# Patient Record
Sex: MALE
[De-identification: names, ages, dates, MRNs, and addresses within clinical notes are randomized per-mention and may not be internally consistent; named-entity substitution may affect disease eponyms.]

---

## 2020-10-15 PROBLEM — Z00.00 ENCOUNTER FOR PREVENTIVE HEALTH EXAMINATION: Status: ACTIVE | Noted: 2020-10-15

## 2020-10-16 ENCOUNTER — APPOINTMENT (OUTPATIENT)
Dept: FAMILY MEDICINE | Facility: CLINIC | Age: 70
End: 2020-10-16
Payer: MEDICARE

## 2020-10-16 VITALS
SYSTOLIC BLOOD PRESSURE: 136 MMHG | TEMPERATURE: 98 F | HEIGHT: 75 IN | BODY MASS INDEX: 23.5 KG/M2 | DIASTOLIC BLOOD PRESSURE: 71 MMHG | RESPIRATION RATE: 18 BRPM | OXYGEN SATURATION: 99 % | WEIGHT: 189 LBS | HEART RATE: 62 BPM

## 2020-10-16 DIAGNOSIS — Z83.3 FAMILY HISTORY OF DIABETES MELLITUS: ICD-10-CM

## 2020-10-16 DIAGNOSIS — Z20.828 CONTACT WITH AND (SUSPECTED) EXPOSURE TO OTHER VIRAL COMMUNICABLE DISEASES: ICD-10-CM
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
## 2020-10-16 DIAGNOSIS — Z84.1 FAMILY HISTORY OF DISORDERS OF KIDNEY AND URETER: ICD-10-CM

## 2020-10-16 DIAGNOSIS — Z78.9 OTHER SPECIFIED HEALTH STATUS: ICD-10-CM

## 2020-10-16 DIAGNOSIS — G25.0 ESSENTIAL TREMOR: ICD-10-CM

## 2020-10-16 DIAGNOSIS — Z80.0 FAMILY HISTORY OF MALIGNANT NEOPLASM OF DIGESTIVE ORGANS: ICD-10-CM

## 2020-10-16 DIAGNOSIS — I63.9 CEREBRAL INFARCTION, UNSPECIFIED: ICD-10-CM

## 2020-10-16 DIAGNOSIS — E78.5 HYPERLIPIDEMIA, UNSPECIFIED: ICD-10-CM

## 2020-10-16 DIAGNOSIS — N40.0 BENIGN PROSTATIC HYPERPLASIA WITHOUT LOWER URINARY TRACT SYMPMS: ICD-10-CM

## 2020-10-16 DIAGNOSIS — M51.26 OTHER INTERVERTEBRAL DISC DISPLACEMENT, LUMBAR REGION: ICD-10-CM

## 2020-10-16 DIAGNOSIS — Z23 ENCOUNTER FOR IMMUNIZATION: ICD-10-CM

## 2020-10-16 DIAGNOSIS — Z76.89 PERSONS ENCOUNTERING HEALTH SERVICES IN OTHER SPECIFIED CIRCUMSTANCES: ICD-10-CM

## 2020-10-16 DIAGNOSIS — Z80.42 FAMILY HISTORY OF MALIGNANT NEOPLASM OF PROSTATE: ICD-10-CM

## 2020-10-16 DIAGNOSIS — Z80.6 FAMILY HISTORY OF LEUKEMIA: ICD-10-CM

## 2020-10-16 DIAGNOSIS — Z80.9 FAMILY HISTORY OF MALIGNANT NEOPLASM, UNSPECIFIED: ICD-10-CM

## 2020-10-16 DIAGNOSIS — Z86.19 PERSONAL HISTORY OF OTHER INFECTIOUS AND PARASITIC DISEASES: ICD-10-CM

## 2020-10-16 DIAGNOSIS — M50.90 CERVICAL DISC DISORDER, UNSPECIFIED, UNSPECIFIED CERVICAL REGION: ICD-10-CM

## 2020-10-16 PROCEDURE — 99204 OFFICE O/P NEW MOD 45 MIN: CPT | Mod: CS,25

## 2020-10-16 PROCEDURE — G0444 DEPRESSION SCREEN ANNUAL: CPT | Mod: 59

## 2020-10-16 RX ORDER — FINASTERIDE 5 MG/1
5 TABLET, FILM COATED ORAL DAILY
Qty: 90 | Refills: 0 | Status: ACTIVE | COMMUNITY
Start: 2020-10-16

## 2020-10-16 RX ORDER — ATORVASTATIN CALCIUM 80 MG/1
TABLET, FILM COATED ORAL
Refills: 0 | Status: DISCONTINUED | COMMUNITY
End: 2020-10-16

## 2020-10-16 RX ORDER — ATORVASTATIN CALCIUM 40 MG/1
40 TABLET, FILM COATED ORAL
Qty: 1 | Refills: 3 | Status: ACTIVE | COMMUNITY
Start: 2020-10-16

## 2020-10-16 RX ORDER — CLOPIDOGREL BISULFATE 75 MG/1
75 TABLET, FILM COATED ORAL DAILY
Qty: 30 | Refills: 0 | Status: ACTIVE | COMMUNITY
Start: 2020-10-16

## 2020-10-16 NOTE — ASSESSMENT
[FreeTextEntry1] : Establish care \par -Will do BW\par \par BPH\par -continue Flomax and Finasteride\par \par Ocular Stroke\par -follows Neuropthamology once a year\par -takes Plavix daily \par \par HLD\par -continue cholesterol medication\par -low fat diet\par -Lipid panel follow up \par -Life style modification discussed\par \par Tick bite\par -check for tick panel  \par \par Lumbar and cervical disc herniation\par -stable \par \par Essential Tremors\par -seeing Neurology possibly benign \par -advised medication and he didn’t take as it makes him drowsy \par \par \par Received Flu vaccine last month at [facility] \par \par \par \par

## 2020-10-16 NOTE — HISTORY OF PRESENT ILLNESS
[FreeTextEntry8] : ROBERTO BELLE 69 year yrs old  male presents office comes to Establish care .\par Wanted to switch doctors Dr Cerna  seeing for about 10 years  -[place] -[place] to drive for him .Was seeing Dr Canas . \par Was seeing Neuropthamology -Ocular stroke 2016 around -Lost peripheral vision ,took ASA started getting eye sight back and pixilating . \par US of carotid was done negative .\par Has a Neuropthamology Dr Ford -Vision testing and went to the ER . Had loop Monitor for 3 years was negative . \par Planning to see cardiology soon . \par Has high cholesterol on Lipitor and takes Plavix for Ocular stroke  .\par Takes Flomax and Finasteride for the BPH . 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  \par Denies any other complaints. No Fever, Chills, Nausea, Vomiting, Diarrhea, Headache, Chest Pain, Shortness of breath or Abdominal pain.\par

## 2020-10-16 NOTE — PHYSICAL EXAM
[No Acute Distress] : no acute distress [Well Nourished] : well nourished [Well Developed] : well developed [Normal Sclera/Conjunctiva] : normal sclera/conjunctiva [Well-Appearing] : well-appearing [PERRL] : pupils equal round and reactive to light [EOMI] : extraocular movements intact [Normal Oropharynx] : the oropharynx was normal [No JVD] : no jugular venous distention [No Lymphadenopathy] : no lymphadenopathy [Supple] : supple [Thyroid Normal, No Nodules] : the thyroid was normal and there were no nodules present [No Respiratory Distress] : no respiratory distress  [No Accessory Muscle Use] : no accessory muscle use [Clear to Auscultation] : lungs were clear to auscultation bilaterally [Normal Rate] : normal rate  [Regular Rhythm] : with a regular rhythm [Normal S1, S2] : normal S1 and S2 [No Murmur] : no murmur heard [No Abdominal Bruit] : a ~M bruit was not heard ~T in the abdomen [No Carotid Bruits] : no carotid bruits [No Varicosities] : no varicosities [Pedal Pulses Present] : the pedal pulses are present [No Edema] : there was no peripheral edema [No Palpable Aorta] : no palpable aorta [No Extremity Clubbing/Cyanosis] : no extremity clubbing/cyanosis [Soft] : abdomen soft [Non Tender] : non-tender [Non-distended] : non-distended [No Masses] : no abdominal mass palpated [Normal Bowel Sounds] : normal bowel sounds [No HSM] : no HSM [Normal Anterior Cervical Nodes] : no anterior cervical lymphadenopathy [Normal Posterior Cervical Nodes] : no posterior cervical lymphadenopathy [No CVA Tenderness] : no CVA  tenderness [No Joint Swelling] : no joint swelling [No Spinal Tenderness] : no spinal tenderness [Grossly Normal Strength/Tone] : grossly normal strength/tone [No Rash] : no rash [No Focal Deficits] : no focal deficits [Normal Gait] : normal gait [Normal Affect] : the affect was normal [Normal Insight/Judgement] : insight and judgment were intact [de-identified] : tremors in hands  [de-identified] : Left ear wax

## 2021-01-26 ENCOUNTER — RX RENEWAL (OUTPATIENT)
Age: 71
End: 2021-01-26

## 2021-01-26 RX ORDER — TAMSULOSIN HYDROCHLORIDE 0.4 MG/1
0.4 CAPSULE ORAL
Qty: 90 | Refills: 0 | Status: ACTIVE | COMMUNITY
Start: 2020-10-16 | End: 1900-01-01

## 2022-07-13 ENCOUNTER — NON-APPOINTMENT (OUTPATIENT)
Age: 72
End: 2022-07-13

## 2023-10-06 ENCOUNTER — OFFICE (OUTPATIENT)
Dept: URBAN - METROPOLITAN AREA CLINIC 115 | Facility: CLINIC | Age: 73
Setting detail: OPHTHALMOLOGY
End: 2023-10-06
Payer: MEDICARE

## 2023-10-06 DIAGNOSIS — I63.50: ICD-10-CM

## 2023-10-06 DIAGNOSIS — H53.433: ICD-10-CM

## 2023-10-06 DIAGNOSIS — H25.13: ICD-10-CM

## 2023-10-06 DIAGNOSIS — H35.033: ICD-10-CM

## 2023-10-06 PROCEDURE — 92014 COMPRE OPH EXAM EST PT 1/>: CPT | Performed by: OPHTHALMOLOGY

## 2023-10-06 PROCEDURE — 92083 EXTENDED VISUAL FIELD XM: CPT | Performed by: OPHTHALMOLOGY

## 2023-10-06 PROCEDURE — 92250 FUNDUS PHOTOGRAPHY W/I&R: CPT | Performed by: OPHTHALMOLOGY

## 2023-10-06 ASSESSMENT — KERATOMETRY
OS_K1POWER_DIOPTERS: 45.25
OD_K1POWER_DIOPTERS: 45.25
OD_K2POWER_DIOPTERS: 46.25
OS_AXISANGLE_DEGREES: 176
METHOD_AUTO_MANUAL: AUTO
OS_K2POWER_DIOPTERS: 46.25
OD_AXISANGLE_DEGREES: 006

## 2023-10-06 ASSESSMENT — CONFRONTATIONAL VISUAL FIELD TEST (CVF)
OD_FINDINGS: FULL
OS_FINDINGS: FULL

## 2023-10-06 ASSESSMENT — TONOMETRY
OD_IOP_MMHG: 16
OS_IOP_MMHG: 18

## 2023-10-06 ASSESSMENT — VISUAL ACUITY
OS_BCVA: 20/30-2
OD_BCVA: 20/20-1

## 2024-02-02 ASSESSMENT — KERATOMETRY
OS_K1POWER_DIOPTERS: 45.25
OS_K2POWER_DIOPTERS: 46.25
METHOD_AUTO_MANUAL: AUTO
OS_AXISANGLE_DEGREES: 176
OD_AXISANGLE_DEGREES: 006
OD_K1POWER_DIOPTERS: 45.25
OD_K2POWER_DIOPTERS: 46.25

## 2024-02-02 ASSESSMENT — REFRACTION_MANIFEST
OD_AXIS: 080
OS_VA1: 20/25+2
OS_CYLINDER: -2.75
OS_ADD: +2.50
OD_ADD: +2.50
OD_VA2: 20/25(J1)
OD_SPHERE: +3.00
OS_VA2: 20/25(J1)
OD_VA1: 20/25-2
OU_VA: 20/20
OS_AXIS: 090
OD_CYLINDER: -1.50
OS_SPHERE: +3.50

## 2024-02-02 ASSESSMENT — REFRACTION_CURRENTRX
OS_OVR_VA: 20/
OS_SPHERE: +3.50
OD_AXIS: 081
OD_CYLINDER: -1.50
OD_SPHERE: +3.00
OS_CYLINDER: -2.75
OS_ADD: +2.50
OD_ADD: +2.50
OS_AXIS: 088
OD_VPRISM_DIRECTION: PROGS
OD_OVR_VA: 20/
OS_VPRISM_DIRECTION: PROGS

## 2024-02-02 ASSESSMENT — REFRACTION_AUTOREFRACTION
OD_SPHERE: +3.50
OS_SPHERE: +3.25
OD_AXIS: 093
OS_CYLINDER: -2.25
OS_AXIS: 087
OD_CYLINDER: -2.00

## 2024-02-02 ASSESSMENT — AXIALLENGTH_DERIVED
OD_AL: 21.9191
OS_AL: 22.046
OS_AL: 22.046
OD_AL: 22.0035

## 2024-02-02 ASSESSMENT — SPHEQUIV_DERIVED
OS_SPHEQUIV: 2.125
OD_SPHEQUIV: 2.25
OS_SPHEQUIV: 2.125
OD_SPHEQUIV: 2.5

## 2024-02-07 ENCOUNTER — OFFICE (OUTPATIENT)
Dept: URBAN - METROPOLITAN AREA CLINIC 115 | Facility: CLINIC | Age: 74
Setting detail: OPHTHALMOLOGY
End: 2024-02-07
Payer: COMMERCIAL

## 2024-02-07 DIAGNOSIS — H25.13: ICD-10-CM

## 2024-02-07 DIAGNOSIS — H35.033: ICD-10-CM

## 2024-02-07 DIAGNOSIS — I63.50: ICD-10-CM

## 2024-02-07 DIAGNOSIS — H53.433: ICD-10-CM

## 2024-02-07 PROCEDURE — 92133 CPTRZD OPH DX IMG PST SGM ON: CPT | Performed by: OPHTHALMOLOGY

## 2024-02-07 PROCEDURE — 92083 EXTENDED VISUAL FIELD XM: CPT | Performed by: OPHTHALMOLOGY

## 2024-02-07 PROCEDURE — 99213 OFFICE O/P EST LOW 20 MIN: CPT | Performed by: OPHTHALMOLOGY

## 2024-02-07 ASSESSMENT — CONFRONTATIONAL VISUAL FIELD TEST (CVF)
OS_FINDINGS: FULL
OD_FINDINGS: FULL

## 2024-02-14 ASSESSMENT — REFRACTION_AUTOREFRACTION
OS_SPHERE: +3.50
OD_CYLINDER: -2.25
OD_AXIS: 095
OS_AXIS: 085
OD_SPHERE: +3.75
OS_CYLINDER: -2.50

## 2024-02-14 ASSESSMENT — REFRACTION_CURRENTRX
OD_AXIS: 081
OS_VPRISM_DIRECTION: PROGS
OD_CYLINDER: -1.50
OS_AXIS: 088
OS_SPHERE: +3.50
OS_CYLINDER: -2.75
OS_OVR_VA: 20/
OD_VPRISM_DIRECTION: PROGS
OD_ADD: +2.50
OS_ADD: +2.50
OD_SPHERE: +3.00
OD_OVR_VA: 20/

## 2024-02-14 ASSESSMENT — REFRACTION_MANIFEST
OD_VA1: 20/25-2
OU_VA: 20/20
OS_AXIS: 090
OD_SPHERE: +3.00
OS_SPHERE: +3.50
OS_VA1: 20/25+2
OS_CYLINDER: -2.75
OS_VA2: 20/25(J1)
OS_ADD: +2.50
OD_CYLINDER: -1.50
OD_ADD: +2.50
OD_AXIS: 080
OD_VA2: 20/25(J1)

## 2024-02-14 ASSESSMENT — SPHEQUIV_DERIVED
OS_SPHEQUIV: 2.125
OD_SPHEQUIV: 2.625
OD_SPHEQUIV: 2.25
OS_SPHEQUIV: 2.25